# Patient Record
(demographics unavailable — no encounter records)

---

## 2024-11-13 NOTE — HISTORY OF PRESENT ILLNESS
[FreeTextEntry1] : 54 y/o Male with PMHx of esophageal varicies, cirrhosis?, hypothyroidism, asthma, HTN, DM (on Ozempic) HIV + (on treatment), who presented 11/10/24 for frequent falls and lethargy at home x 1 week (Last fall the day prior with head strike but no LOC, but with urinary incontinence). CTH in ER showing possible trace traumatic SAH along posterior left frontal convexity.   Hospital Course:  11/10: Admitted to NSICU. Stability scan showing decreased conspicuity and possible resolution of SAH focus. CTA neck showing possible R vert dissection vs hypoplastic vert. Ordered MRA neck with dissection protocol. Restarted home lexapro. Given vitamin K 10mg x 1. Started lactulose 10mg BID (titrate to 3 BMs/day). Started SQL.  11/12: home med nevirapine brought in, pot chlor 40 1x  Returns today post hospitalization for follow- up.

## 2024-11-13 NOTE — DATA REVIEWED
[de-identified] :   ACC: [id number] EXAM: CT ANGIO BRAIN (W)AW IC ORDERED BY: JAYDEN FARLEY  ACC: [id number] EXAM: CT ANGIO NECK (W)AW IC ORDERED BY: JAYDEN FARLEY  PROCEDURE DATE: 11/10/2024    INTERPRETATION: CLINICAL INFORMATION: SAH  COMPARISON: None.  CONTRAST: IV Contrast: Isovue 370 80 cc administered 20 cc discarded Complications: None reported at time of study completion  TECHNIQUE:  CTA NECK/HEAD: After the intravenous power injection of non-ionic contrast material, serial thin sections were obtained through the cervical and intracranial circulation on a multislice CT scanner. Axial, Coronal and Sagittal MIP reformatted images were obtained. 3D reconstruction was also performed.  FINDINGS:  CTA NECK:  AORTIC ARCH AND VISUALIZED GREAT VESSELS: Presence of a bovine arch is incidentally noted. This represents a normal anatomical variant.  RIGHT: COMMON CAROTID ARTERY: No significant stenosis to the carotid bifurcation. INTERNAL CAROTID ARTERY: No significant stenosis based on NASCET criteria. VERTEBRAL ARTERY: Hypoplastic but patent right vertebral artery. If there is clinical concern for dissection, consider further evaluation to include MRA imaging of the neck utilizing dissection technique (axial T1-weighted imaging with fat saturation).  LEFT: COMMON CAROTID ARTERY: No significant stenosis to the carotid bifurcation. INTERNAL CAROTID ARTERY: No significant stenosis based on NASCET criteria. VERTEBRAL ARTERY: Normal in course and caliber to the intracranial circulation.  VISUALIZED LUNGS: Clear.  MISCELLANEOUS: None.  CAROTID STENOSIS REFERENCE: Percent (%) stenosis is expressed in terms of NASCET Criteria. (NASCET = 100x1-(N/D)). N=greatest narrowing. D=normal distal diameter - MILD = <50% stenosis. - MODERATE = 50-69% stenosis. - SEVERE = 70-89% stenosis. - HAIRLINE/CRITICAL = 90-99% stenosis. - OCCLUDED = 100% stenosis.   CTA HEAD:  INTERNAL CAROTID ARTERIES: Bilateral petrous, precavernous, cavernous, and supraclinoid regions are patent without significant stenosis.  [place]: No aneurysm identified. Tiny aneurysms can be beyond the resolution of CTA technique.  ANTERIOR CEREBRAL ARTERIES: Hypoplastic but patent right A1 segment. MIDDLE CEREBRAL ARTERIES: No significant stenosis or occlusion. POSTERIOR CEREBRAL ARTERIES: Hypoplastic right P1 segment, with a fetal origin of the right posterior cerebral artery.  DISTAL VERTEBRAL / BASILAR ARTERIES: Attenuated appearance of the intracranial right vertebral artery which appears to terminate in the right posterior inferior cerebellar artery, a normal anatomical variant.  VENOUS STRUCTURES: Visualized superficial and deep venous structures appear patent.  MISCELLANEOUS: No other vascular abnormality is seen.   IMPRESSION:  CTA NECK: 1. No evidence of significant stenosis or occlusion in association with bilateral common carotid arteries or bilateral internal carotid arteries. 2. Hypoplastic but patent right vertebral artery. If there is clinical concern for dissection, consider further evaluation to include MRA imaging of the neck utilizing dissection technique (axial T1-weighted imaging with fat saturation).  CTA HEAD: 1. Hypoplastic intracranial right vertebral artery which appears to terminate in the right posterior inferior cerebellar artery, a normal anatomical variant. 2. Hypoplastic but patent right A1 segment. 3. Hypoplastic right P1 segment with a relative fetal origin of the right posterior cerebral artery. 4. No evidence of aneurysm. Tiny aneurysms can be beyond the resolution of CTA technique.  Findings were discussed with SACHIN FARLEY [phone] 11/10/2024 7:37 PM by Dr. Behr Ventura with read back confirmation.  --- End of Report ---

## 2024-11-27 NOTE — HISTORY OF PRESENT ILLNESS
[FreeTextEntry1] : 52 y/o Male with PMHx of esophageal varicies, cirrhosis?, hypothyroidism, asthma, HTN, DM (on Ozempic) HIV + (on treatment), who presented to ER 11/10/24 for frequent falls and lethargy at home x 1 week (Last fall the day prior with head strike but no LOC, but with urinary incontinence). CTH in ER showing possible trace traumatic SAH along posterior left frontal convexity.   Hospital Course:  11/10: Admitted to NSICU. Stability scan showing decreased conspicuity and possible resolution of SAH focus. CTA neck showing possible R vert dissection vs hypoplastic vert. Ordered MRA neck with dissection protocol. Restarted home lexapro. Given vitamin K 10mg x 1. Started lactulose 10mg BID (titrate to 3 BMs/day). Started SQL.  11/12: home med nevirapine brought in, pot chlor 40 1x  He completed MRA head and neck 11/15 with report of no hemodynamically significant stenosis.   Returns today post hospitalization for follow- up.  He endorses overall doing well without headaches, dizziness, any more falls, seizure like activities.

## 2024-11-27 NOTE — ASSESSMENT
[FreeTextEntry1] : 53M with fall found to have small cortical SAH on left frontal lobe, now resolved also with concern for right vert dissection. MRA negative except for hypoplastic vert. Maintains care with Dr. Broussard. No follow up necessary.   Dr. D'Amico independently reviewed all available images with patient.   PLAN: - Continue f/u with PCP for comprehensive care - RTC prn   Patient verbalizes understanding of today's discussion and next steps in treatment plan.   Today, my ACP, Gabriela Zimmerman, was here to observe my evaluation and management services for this patient to be followed going forward.     A total of 15 minutes was spent reviewing the labs, imaging and physical examination of the patient. We discussed the diagnosis, and the plan. The patient's questions were answered. The patient demonstrated an excellent understanding of the plan.

## 2024-11-27 NOTE — DATA REVIEWED
[de-identified] :   ACC: [id number] EXAM: CT ANGIO BRAIN (W)AW IC ORDERED BY: JAYDEN FARLEY  ACC: [id number] EXAM: CT ANGIO NECK (W)AW IC ORDERED BY: JAYDEN FARLEY  PROCEDURE DATE: 11/10/2024    INTERPRETATION: CLINICAL INFORMATION: SAH  COMPARISON: None.  CONTRAST: IV Contrast: Isovue 370 80 cc administered 20 cc discarded Complications: None reported at time of study completion  TECHNIQUE:  CTA NECK/HEAD: After the intravenous power injection of non-ionic contrast material, serial thin sections were obtained through the cervical and intracranial circulation on a multislice CT scanner. Axial, Coronal and Sagittal MIP reformatted images were obtained. 3D reconstruction was also performed.  FINDINGS:  CTA NECK:  AORTIC ARCH AND VISUALIZED GREAT VESSELS: Presence of a bovine arch is incidentally noted. This represents a normal anatomical variant.  RIGHT: COMMON CAROTID ARTERY: No significant stenosis to the carotid bifurcation. INTERNAL CAROTID ARTERY: No significant stenosis based on NASCET criteria. VERTEBRAL ARTERY: Hypoplastic but patent right vertebral artery. If there is clinical concern for dissection, consider further evaluation to include MRA imaging of the neck utilizing dissection technique (axial T1-weighted imaging with fat saturation).  LEFT: COMMON CAROTID ARTERY: No significant stenosis to the carotid bifurcation. INTERNAL CAROTID ARTERY: No significant stenosis based on NASCET criteria. VERTEBRAL ARTERY: Normal in course and caliber to the intracranial circulation.  VISUALIZED LUNGS: Clear.  MISCELLANEOUS: None.  CAROTID STENOSIS REFERENCE: Percent (%) stenosis is expressed in terms of NASCET Criteria. (NASCET = 100x1-(N/D)). N=greatest narrowing. D=normal distal diameter - MILD = <50% stenosis. - MODERATE = 50-69% stenosis. - SEVERE = 70-89% stenosis. - HAIRLINE/CRITICAL = 90-99% stenosis. - OCCLUDED = 100% stenosis.   CTA HEAD:  INTERNAL CAROTID ARTERIES: Bilateral petrous, precavernous, cavernous, and supraclinoid regions are patent without significant stenosis.  [place]: No aneurysm identified. Tiny aneurysms can be beyond the resolution of CTA technique.  ANTERIOR CEREBRAL ARTERIES: Hypoplastic but patent right A1 segment. MIDDLE CEREBRAL ARTERIES: No significant stenosis or occlusion. POSTERIOR CEREBRAL ARTERIES: Hypoplastic right P1 segment, with a fetal origin of the right posterior cerebral artery.  DISTAL VERTEBRAL / BASILAR ARTERIES: Attenuated appearance of the intracranial right vertebral artery which appears to terminate in the right posterior inferior cerebellar artery, a normal anatomical variant.  VENOUS STRUCTURES: Visualized superficial and deep venous structures appear patent.  MISCELLANEOUS: No other vascular abnormality is seen.   IMPRESSION:  CTA NECK: 1. No evidence of significant stenosis or occlusion in association with bilateral common carotid arteries or bilateral internal carotid arteries. 2. Hypoplastic but patent right vertebral artery. If there is clinical concern for dissection, consider further evaluation to include MRA imaging of the neck utilizing dissection technique (axial T1-weighted imaging with fat saturation).  CTA HEAD: 1. Hypoplastic intracranial right vertebral artery which appears to terminate in the right posterior inferior cerebellar artery, a normal anatomical variant. 2. Hypoplastic but patent right A1 segment. 3. Hypoplastic right P1 segment with a relative fetal origin of the right posterior cerebral artery. 4. No evidence of aneurysm. Tiny aneurysms can be beyond the resolution of CTA technique.  Findings were discussed with SACHIN FARLEY [phone] 11/10/2024 7:37 PM by Dr. Behr Ventura with read back confirmation.  --- End of Report ---   [de-identified] : ACC: [id number]     EXAM:  MR ANGIO NECK   ORDERED BY: BENNIE MIN  ACC: [id number]     EXAM:  MR ANGIO BRAIN   ORDERED BY: BENNIE MIN  PROCEDURE DATE:  11/25/2024    INTERPRETATION:  CLINICAL STATEMENT: Evaluate for stenosis.  TECHNIQUE: MRA of the head without gadolinium. MRA of the neck without gadolinium.  COMPARISON: CTA head and neck 11/10/2024.  FINDINGS: MRA of the neck: The visualized common carotid and internal carotid arteries are patent.  The visualized extracranial vertebral arteries are patent. Dominant left vertebral artery.  MRA of the head:   The proximal anterior, middle and posterior cerebral arteries are patent. Fetal origin right posterior cerebral artery  The intracranial vertebral and basilar arteries are patent.  There is no MR evidence of intracranial aneurysm.    IMPRESSION: No hemodynamically significant stenosis  --- End of Report ---      CHRISTOPHE KABA MD; Attending Radiologist This document has been electronically signed. Nov 25 2024 10:14AM

## 2024-11-27 NOTE — DATA REVIEWED
[de-identified] :   ACC: [id number] EXAM: CT ANGIO BRAIN (W)AW IC ORDERED BY: JAYDEN FARLEY  ACC: [id number] EXAM: CT ANGIO NECK (W)AW IC ORDERED BY: JAYDEN FARLEY  PROCEDURE DATE: 11/10/2024    INTERPRETATION: CLINICAL INFORMATION: SAH  COMPARISON: None.  CONTRAST: IV Contrast: Isovue 370 80 cc administered 20 cc discarded Complications: None reported at time of study completion  TECHNIQUE:  CTA NECK/HEAD: After the intravenous power injection of non-ionic contrast material, serial thin sections were obtained through the cervical and intracranial circulation on a multislice CT scanner. Axial, Coronal and Sagittal MIP reformatted images were obtained. 3D reconstruction was also performed.  FINDINGS:  CTA NECK:  AORTIC ARCH AND VISUALIZED GREAT VESSELS: Presence of a bovine arch is incidentally noted. This represents a normal anatomical variant.  RIGHT: COMMON CAROTID ARTERY: No significant stenosis to the carotid bifurcation. INTERNAL CAROTID ARTERY: No significant stenosis based on NASCET criteria. VERTEBRAL ARTERY: Hypoplastic but patent right vertebral artery. If there is clinical concern for dissection, consider further evaluation to include MRA imaging of the neck utilizing dissection technique (axial T1-weighted imaging with fat saturation).  LEFT: COMMON CAROTID ARTERY: No significant stenosis to the carotid bifurcation. INTERNAL CAROTID ARTERY: No significant stenosis based on NASCET criteria. VERTEBRAL ARTERY: Normal in course and caliber to the intracranial circulation.  VISUALIZED LUNGS: Clear.  MISCELLANEOUS: None.  CAROTID STENOSIS REFERENCE: Percent (%) stenosis is expressed in terms of NASCET Criteria. (NASCET = 100x1-(N/D)). N=greatest narrowing. D=normal distal diameter - MILD = <50% stenosis. - MODERATE = 50-69% stenosis. - SEVERE = 70-89% stenosis. - HAIRLINE/CRITICAL = 90-99% stenosis. - OCCLUDED = 100% stenosis.   CTA HEAD:  INTERNAL CAROTID ARTERIES: Bilateral petrous, precavernous, cavernous, and supraclinoid regions are patent without significant stenosis.  [place]: No aneurysm identified. Tiny aneurysms can be beyond the resolution of CTA technique.  ANTERIOR CEREBRAL ARTERIES: Hypoplastic but patent right A1 segment. MIDDLE CEREBRAL ARTERIES: No significant stenosis or occlusion. POSTERIOR CEREBRAL ARTERIES: Hypoplastic right P1 segment, with a fetal origin of the right posterior cerebral artery.  DISTAL VERTEBRAL / BASILAR ARTERIES: Attenuated appearance of the intracranial right vertebral artery which appears to terminate in the right posterior inferior cerebellar artery, a normal anatomical variant.  VENOUS STRUCTURES: Visualized superficial and deep venous structures appear patent.  MISCELLANEOUS: No other vascular abnormality is seen.   IMPRESSION:  CTA NECK: 1. No evidence of significant stenosis or occlusion in association with bilateral common carotid arteries or bilateral internal carotid arteries. 2. Hypoplastic but patent right vertebral artery. If there is clinical concern for dissection, consider further evaluation to include MRA imaging of the neck utilizing dissection technique (axial T1-weighted imaging with fat saturation).  CTA HEAD: 1. Hypoplastic intracranial right vertebral artery which appears to terminate in the right posterior inferior cerebellar artery, a normal anatomical variant. 2. Hypoplastic but patent right A1 segment. 3. Hypoplastic right P1 segment with a relative fetal origin of the right posterior cerebral artery. 4. No evidence of aneurysm. Tiny aneurysms can be beyond the resolution of CTA technique.  Findings were discussed with SACHIN FARLEY [phone] 11/10/2024 7:37 PM by Dr. Behr Ventura with read back confirmation.  --- End of Report ---   [de-identified] : ACC: [id number]     EXAM:  MR ANGIO NECK   ORDERED BY: BENNIE MIN  ACC: [id number]     EXAM:  MR ANGIO BRAIN   ORDERED BY: BENNIE MIN  PROCEDURE DATE:  11/25/2024    INTERPRETATION:  CLINICAL STATEMENT: Evaluate for stenosis.  TECHNIQUE: MRA of the head without gadolinium. MRA of the neck without gadolinium.  COMPARISON: CTA head and neck 11/10/2024.  FINDINGS: MRA of the neck: The visualized common carotid and internal carotid arteries are patent.  The visualized extracranial vertebral arteries are patent. Dominant left vertebral artery.  MRA of the head:   The proximal anterior, middle and posterior cerebral arteries are patent. Fetal origin right posterior cerebral artery  The intracranial vertebral and basilar arteries are patent.  There is no MR evidence of intracranial aneurysm.    IMPRESSION: No hemodynamically significant stenosis  --- End of Report ---      CHRISTOPHE KABA MD; Attending Radiologist This document has been electronically signed. Nov 25 2024 10:14AM

## 2024-11-27 NOTE — DATA REVIEWED
[de-identified] :   ACC: [id number] EXAM: CT ANGIO BRAIN (W)AW IC ORDERED BY: JAYDEN FARLEY  ACC: [id number] EXAM: CT ANGIO NECK (W)AW IC ORDERED BY: JAYDEN FARLEY  PROCEDURE DATE: 11/10/2024    INTERPRETATION: CLINICAL INFORMATION: SAH  COMPARISON: None.  CONTRAST: IV Contrast: Isovue 370 80 cc administered 20 cc discarded Complications: None reported at time of study completion  TECHNIQUE:  CTA NECK/HEAD: After the intravenous power injection of non-ionic contrast material, serial thin sections were obtained through the cervical and intracranial circulation on a multislice CT scanner. Axial, Coronal and Sagittal MIP reformatted images were obtained. 3D reconstruction was also performed.  FINDINGS:  CTA NECK:  AORTIC ARCH AND VISUALIZED GREAT VESSELS: Presence of a bovine arch is incidentally noted. This represents a normal anatomical variant.  RIGHT: COMMON CAROTID ARTERY: No significant stenosis to the carotid bifurcation. INTERNAL CAROTID ARTERY: No significant stenosis based on NASCET criteria. VERTEBRAL ARTERY: Hypoplastic but patent right vertebral artery. If there is clinical concern for dissection, consider further evaluation to include MRA imaging of the neck utilizing dissection technique (axial T1-weighted imaging with fat saturation).  LEFT: COMMON CAROTID ARTERY: No significant stenosis to the carotid bifurcation. INTERNAL CAROTID ARTERY: No significant stenosis based on NASCET criteria. VERTEBRAL ARTERY: Normal in course and caliber to the intracranial circulation.  VISUALIZED LUNGS: Clear.  MISCELLANEOUS: None.  CAROTID STENOSIS REFERENCE: Percent (%) stenosis is expressed in terms of NASCET Criteria. (NASCET = 100x1-(N/D)). N=greatest narrowing. D=normal distal diameter - MILD = <50% stenosis. - MODERATE = 50-69% stenosis. - SEVERE = 70-89% stenosis. - HAIRLINE/CRITICAL = 90-99% stenosis. - OCCLUDED = 100% stenosis.   CTA HEAD:  INTERNAL CAROTID ARTERIES: Bilateral petrous, precavernous, cavernous, and supraclinoid regions are patent without significant stenosis.  [place]: No aneurysm identified. Tiny aneurysms can be beyond the resolution of CTA technique.  ANTERIOR CEREBRAL ARTERIES: Hypoplastic but patent right A1 segment. MIDDLE CEREBRAL ARTERIES: No significant stenosis or occlusion. POSTERIOR CEREBRAL ARTERIES: Hypoplastic right P1 segment, with a fetal origin of the right posterior cerebral artery.  DISTAL VERTEBRAL / BASILAR ARTERIES: Attenuated appearance of the intracranial right vertebral artery which appears to terminate in the right posterior inferior cerebellar artery, a normal anatomical variant.  VENOUS STRUCTURES: Visualized superficial and deep venous structures appear patent.  MISCELLANEOUS: No other vascular abnormality is seen.   IMPRESSION:  CTA NECK: 1. No evidence of significant stenosis or occlusion in association with bilateral common carotid arteries or bilateral internal carotid arteries. 2. Hypoplastic but patent right vertebral artery. If there is clinical concern for dissection, consider further evaluation to include MRA imaging of the neck utilizing dissection technique (axial T1-weighted imaging with fat saturation).  CTA HEAD: 1. Hypoplastic intracranial right vertebral artery which appears to terminate in the right posterior inferior cerebellar artery, a normal anatomical variant. 2. Hypoplastic but patent right A1 segment. 3. Hypoplastic right P1 segment with a relative fetal origin of the right posterior cerebral artery. 4. No evidence of aneurysm. Tiny aneurysms can be beyond the resolution of CTA technique.  Findings were discussed with SACHIN FARLEY [phone] 11/10/2024 7:37 PM by Dr. Behr Ventura with read back confirmation.  --- End of Report ---   [de-identified] : ACC: [id number]     EXAM:  MR ANGIO NECK   ORDERED BY: BENNIE MIN  ACC: [id number]     EXAM:  MR ANGIO BRAIN   ORDERED BY: BENNIE MIN  PROCEDURE DATE:  11/25/2024    INTERPRETATION:  CLINICAL STATEMENT: Evaluate for stenosis.  TECHNIQUE: MRA of the head without gadolinium. MRA of the neck without gadolinium.  COMPARISON: CTA head and neck 11/10/2024.  FINDINGS: MRA of the neck: The visualized common carotid and internal carotid arteries are patent.  The visualized extracranial vertebral arteries are patent. Dominant left vertebral artery.  MRA of the head:   The proximal anterior, middle and posterior cerebral arteries are patent. Fetal origin right posterior cerebral artery  The intracranial vertebral and basilar arteries are patent.  There is no MR evidence of intracranial aneurysm.    IMPRESSION: No hemodynamically significant stenosis  --- End of Report ---      CHRISTOPHE KABA MD; Attending Radiologist This document has been electronically signed. Nov 25 2024 10:14AM

## 2024-11-27 NOTE — PHYSICAL EXAM
[General Appearance - Alert] : alert [General Appearance - In No Acute Distress] : in no acute distress [General Appearance - Well-Appearing] : healthy appearing [Oriented To Time, Place, And Person] : oriented to person, place, and time [Impaired Insight] : insight and judgment were intact [Affect] : the affect was normal [Memory Recent] : recent memory was not impaired [Motor Tone] : muscle tone was normal in all four extremities [Motor Strength] : muscle strength was normal in all four extremities [Sclera] : the sclera and conjunctiva were normal [Neck Appearance] : the appearance of the neck was normal [] : no respiratory distress [Respiration, Rhythm And Depth] : normal respiratory rhythm and effort [Abnormal Walk] : normal gait [Skin Color & Pigmentation] : normal skin color and pigmentation [FreeTextEntry5] : CN II-XII grossly intact